# Patient Record
Sex: MALE | URBAN - METROPOLITAN AREA
[De-identification: names, ages, dates, MRNs, and addresses within clinical notes are randomized per-mention and may not be internally consistent; named-entity substitution may affect disease eponyms.]

---

## 2023-06-14 ENCOUNTER — TELEPHONE (OUTPATIENT)
Dept: UROLOGY | Facility: AMBULATORY SURGERY CENTER | Age: 28
End: 2023-06-14

## 2023-06-14 NOTE — TELEPHONE ENCOUNTER
However, I did send her the templates so she can copy aNew Patient    What is the reason for the patient’s appointment?: vasectomy consult     What office location does the patient prefer?: [place]     Have patient records been requested?:  If No, are the records showing in Epic: records in epic       INSURANCE:   Do we accept the patient's insurance or is the patient Self-Pay?: yes [facility] [id number]  ID [id number]      HISTORY:   Has the patient had any previous Urologist(s)?: no     Was the patient seen in the ED?: no     Has the patient had any outside testing done?: no     Does the patient have a personal history of cancer?: no